# Patient Record
Sex: MALE | Race: WHITE | Employment: FULL TIME | URBAN - NONMETROPOLITAN AREA
[De-identification: names, ages, dates, MRNs, and addresses within clinical notes are randomized per-mention and may not be internally consistent; named-entity substitution may affect disease eponyms.]

---

## 2022-04-20 ENCOUNTER — APPOINTMENT (OUTPATIENT)
Dept: GENERAL RADIOLOGY | Age: 42
End: 2022-04-20
Payer: COMMERCIAL

## 2022-04-20 ENCOUNTER — HOSPITAL ENCOUNTER (OUTPATIENT)
Age: 42
Setting detail: OBSERVATION
Discharge: HOME | End: 2022-04-21
Attending: EMERGENCY MEDICINE | Admitting: INTERNAL MEDICINE
Payer: COMMERCIAL

## 2022-04-20 DIAGNOSIS — T78.40XA ALLERGIC REACTION, INITIAL ENCOUNTER: ICD-10-CM

## 2022-04-20 DIAGNOSIS — I21.4 NSTEMI (NON-ST ELEVATED MYOCARDIAL INFARCTION) (HCC): Primary | ICD-10-CM

## 2022-04-20 PROBLEM — R07.9 CHEST PAIN: Status: ACTIVE | Noted: 2022-04-20

## 2022-04-20 PROBLEM — I10 ESSENTIAL HYPERTENSION: Status: ACTIVE | Noted: 2020-06-23

## 2022-04-20 LAB
ALBUMIN SERPL-MCNC: 3.9 GM/DL (ref 3.4–5)
ALP BLD-CCNC: 70 U/L (ref 46–116)
ALT SERPL-CCNC: 32 U/L (ref 14–63)
ANION GAP: 9 MEQ/L (ref 8–16)
AST SERPL-CCNC: 20 U/L (ref 15–37)
BASOPHILS # BLD: 0.1 %
BASOPHILS ABSOLUTE: 0 THOU/MM3 (ref 0–0.1)
BILIRUB SERPL-MCNC: 0.6 MG/DL (ref 0.2–1)
BUN BLDV-MCNC: 15 MG/DL (ref 7–18)
CHLORIDE BLD-SCNC: 103 MEQ/L (ref 98–107)
CO2: 25 MEQ/L (ref 21–32)
CREAT SERPL-MCNC: 1.3 MG/DL (ref 0.6–1.3)
EKG ATRIAL RATE: 79 BPM
EKG ATRIAL RATE: 82 BPM
EKG P AXIS: 43 DEGREES
EKG P AXIS: 50 DEGREES
EKG P-R INTERVAL: 156 MS
EKG P-R INTERVAL: 160 MS
EKG Q-T INTERVAL: 368 MS
EKG Q-T INTERVAL: 370 MS
EKG QRS DURATION: 114 MS
EKG QRS DURATION: 116 MS
EKG QTC CALCULATION (BAZETT): 424 MS
EKG QTC CALCULATION (BAZETT): 429 MS
EKG R AXIS: -36 DEGREES
EKG R AXIS: -40 DEGREES
EKG T AXIS: 22 DEGREES
EKG T AXIS: 23 DEGREES
EKG VENTRICULAR RATE: 79 BPM
EKG VENTRICULAR RATE: 82 BPM
EOSINOPHIL # BLD: 0.2 %
EOSINOPHILS ABSOLUTE: 0 THOU/MM3 (ref 0–0.4)
GFR, ESTIMATED: 65 ML/MIN/1.73M2
GLUCOSE BLD-MCNC: 125 MG/DL (ref 74–106)
HCT VFR BLD CALC: 45.5 % (ref 42–52)
HEMOGLOBIN: 15.6 GM/DL (ref 14–18)
IMMATURE GRANS (ABS): 0.07 THOU/MM3 (ref 0–0.07)
IMMATURE GRANULOCYTES: 0.4 %
LYMPHOCYTES # BLD: 6.5 %
LYMPHOCYTES ABSOLUTE: 1 THOU/MM3 (ref 1–4.8)
MCH RBC QN AUTO: 29.6 PG (ref 27–31)
MCHC RBC AUTO-ENTMCNC: 34.2 GM/DL (ref 33–37)
MCV RBC AUTO: 86.6 FL (ref 80–94)
MONOCYTES # BLD: 5.8 %
MONOCYTES ABSOLUTE: 0.9 THOU/MM3 (ref 0.4–1.3)
NUCLEATED RED BLOOD CELLS: 0 /100 WBC
PDW BLD-RTO: 13.2 % (ref 11.5–14.5)
PLATELET # BLD: 173 THOU/MM3 (ref 130–400)
PMV BLD AUTO: 8.1 FL (ref 7.4–10.4)
POC CALCIUM: 8.8 MG/DL (ref 8.5–10.1)
POTASSIUM SERPL-SCNC: 3.7 MEQ/L (ref 3.5–5.1)
RBC # BLD: 5.26 MILL/MM3 (ref 4.7–6.1)
SEG NEUTROPHILS: 87 %
SEGMENTED NEUTROPHILS ABSOLUTE COUNT: 13.6 THOU/MM3 (ref 1.8–7.7)
SODIUM BLD-SCNC: 137 MEQ/L (ref 136–145)
TOTAL PROTEIN: 6.8 GM/DL (ref 6.4–8.2)
TROPONIN T: < 0.01 NG/ML
TROPONIN, HIGH SENSITIVITY: 80.7 PG/ML (ref 0–76.1)
WBC # BLD: 16 THOU/MM3 (ref 4.8–10.8)

## 2022-04-20 PROCEDURE — G0378 HOSPITAL OBSERVATION PER HR: HCPCS

## 2022-04-20 PROCEDURE — 96361 HYDRATE IV INFUSION ADD-ON: CPT

## 2022-04-20 PROCEDURE — 84484 ASSAY OF TROPONIN QUANT: CPT

## 2022-04-20 PROCEDURE — 6360000002 HC RX W HCPCS: Performed by: EMERGENCY MEDICINE

## 2022-04-20 PROCEDURE — 71045 X-RAY EXAM CHEST 1 VIEW: CPT

## 2022-04-20 PROCEDURE — 96374 THER/PROPH/DIAG INJ IV PUSH: CPT

## 2022-04-20 PROCEDURE — 99285 EMERGENCY DEPT VISIT HI MDM: CPT

## 2022-04-20 PROCEDURE — 6370000000 HC RX 637 (ALT 250 FOR IP): Performed by: EMERGENCY MEDICINE

## 2022-04-20 PROCEDURE — 36415 COLL VENOUS BLD VENIPUNCTURE: CPT

## 2022-04-20 PROCEDURE — 2580000003 HC RX 258: Performed by: EMERGENCY MEDICINE

## 2022-04-20 PROCEDURE — 85025 COMPLETE CBC W/AUTO DIFF WBC: CPT

## 2022-04-20 PROCEDURE — 93005 ELECTROCARDIOGRAM TRACING: CPT | Performed by: EMERGENCY MEDICINE

## 2022-04-20 PROCEDURE — 6370000000 HC RX 637 (ALT 250 FOR IP): Performed by: PHYSICIAN ASSISTANT

## 2022-04-20 PROCEDURE — 80053 COMPREHEN METABOLIC PANEL: CPT

## 2022-04-20 PROCEDURE — 99220 PR INITIAL OBSERVATION CARE/DAY 70 MINUTES: CPT | Performed by: PHYSICIAN ASSISTANT

## 2022-04-20 PROCEDURE — 2580000003 HC RX 258: Performed by: PHYSICIAN ASSISTANT

## 2022-04-20 PROCEDURE — 96372 THER/PROPH/DIAG INJ SC/IM: CPT

## 2022-04-20 RX ORDER — ENOXAPARIN SODIUM 100 MG/ML
100 INJECTION SUBCUTANEOUS 2 TIMES DAILY
Status: DISCONTINUED | OUTPATIENT
Start: 2022-04-21 | End: 2022-04-21 | Stop reason: HOSPADM

## 2022-04-20 RX ORDER — ENOXAPARIN SODIUM 100 MG/ML
1 INJECTION SUBCUTANEOUS ONCE
Status: COMPLETED | OUTPATIENT
Start: 2022-04-20 | End: 2022-04-20

## 2022-04-20 RX ORDER — ONDANSETRON 2 MG/ML
4 INJECTION INTRAMUSCULAR; INTRAVENOUS EVERY 6 HOURS PRN
Status: DISCONTINUED | OUTPATIENT
Start: 2022-04-20 | End: 2022-04-21 | Stop reason: HOSPADM

## 2022-04-20 RX ORDER — POTASSIUM CHLORIDE 20 MEQ/1
40 TABLET, EXTENDED RELEASE ORAL PRN
Status: DISCONTINUED | OUTPATIENT
Start: 2022-04-20 | End: 2022-04-21 | Stop reason: HOSPADM

## 2022-04-20 RX ORDER — OLMESARTAN MEDOXOMIL 20 MG/1
20 TABLET ORAL DAILY
COMMUNITY

## 2022-04-20 RX ORDER — METHYLPREDNISOLONE SODIUM SUCCINATE 125 MG/2ML
125 INJECTION, POWDER, LYOPHILIZED, FOR SOLUTION INTRAMUSCULAR; INTRAVENOUS ONCE
Status: COMPLETED | OUTPATIENT
Start: 2022-04-20 | End: 2022-04-20

## 2022-04-20 RX ORDER — ACETAMINOPHEN 650 MG/1
650 SUPPOSITORY RECTAL EVERY 6 HOURS PRN
Status: DISCONTINUED | OUTPATIENT
Start: 2022-04-20 | End: 2022-04-21 | Stop reason: HOSPADM

## 2022-04-20 RX ORDER — ATORVASTATIN CALCIUM 40 MG/1
40 TABLET, FILM COATED ORAL NIGHTLY
Status: DISCONTINUED | OUTPATIENT
Start: 2022-04-20 | End: 2022-04-21 | Stop reason: HOSPADM

## 2022-04-20 RX ORDER — ONDANSETRON 4 MG/1
4 TABLET, ORALLY DISINTEGRATING ORAL EVERY 8 HOURS PRN
Status: DISCONTINUED | OUTPATIENT
Start: 2022-04-20 | End: 2022-04-21 | Stop reason: HOSPADM

## 2022-04-20 RX ORDER — IBUPROFEN 200 MG
200 TABLET ORAL EVERY 6 HOURS PRN
Status: ON HOLD | COMMUNITY
End: 2022-04-21 | Stop reason: HOSPADM

## 2022-04-20 RX ORDER — POLYETHYLENE GLYCOL 3350 17 G/17G
17 POWDER, FOR SOLUTION ORAL DAILY PRN
Status: DISCONTINUED | OUTPATIENT
Start: 2022-04-20 | End: 2022-04-21 | Stop reason: HOSPADM

## 2022-04-20 RX ORDER — MAGNESIUM SULFATE IN WATER 40 MG/ML
2000 INJECTION, SOLUTION INTRAVENOUS PRN
Status: DISCONTINUED | OUTPATIENT
Start: 2022-04-20 | End: 2022-04-21 | Stop reason: HOSPADM

## 2022-04-20 RX ORDER — POTASSIUM CHLORIDE 7.45 MG/ML
10 INJECTION INTRAVENOUS PRN
Status: DISCONTINUED | OUTPATIENT
Start: 2022-04-20 | End: 2022-04-21 | Stop reason: HOSPADM

## 2022-04-20 RX ORDER — LOSARTAN POTASSIUM 50 MG/1
50 TABLET ORAL DAILY
Status: DISCONTINUED | OUTPATIENT
Start: 2022-04-21 | End: 2022-04-21 | Stop reason: HOSPADM

## 2022-04-20 RX ORDER — CLOPIDOGREL 300 MG/1
300 TABLET, FILM COATED ORAL ONCE
Status: COMPLETED | OUTPATIENT
Start: 2022-04-20 | End: 2022-04-20

## 2022-04-20 RX ORDER — SODIUM CHLORIDE 0.9 % (FLUSH) 0.9 %
5-40 SYRINGE (ML) INJECTION EVERY 12 HOURS SCHEDULED
Status: DISCONTINUED | OUTPATIENT
Start: 2022-04-20 | End: 2022-04-21 | Stop reason: HOSPADM

## 2022-04-20 RX ORDER — SODIUM CHLORIDE 9 MG/ML
INJECTION, SOLUTION INTRAVENOUS PRN
Status: DISCONTINUED | OUTPATIENT
Start: 2022-04-20 | End: 2022-04-21 | Stop reason: HOSPADM

## 2022-04-20 RX ORDER — AMLODIPINE BESYLATE 5 MG/1
5 TABLET ORAL DAILY
Status: DISCONTINUED | OUTPATIENT
Start: 2022-04-21 | End: 2022-04-21 | Stop reason: HOSPADM

## 2022-04-20 RX ORDER — 0.9 % SODIUM CHLORIDE 0.9 %
1000 INTRAVENOUS SOLUTION INTRAVENOUS ONCE
Status: COMPLETED | OUTPATIENT
Start: 2022-04-20 | End: 2022-04-20

## 2022-04-20 RX ORDER — ACETAMINOPHEN 325 MG/1
650 TABLET ORAL EVERY 6 HOURS PRN
Status: DISCONTINUED | OUTPATIENT
Start: 2022-04-20 | End: 2022-04-21 | Stop reason: HOSPADM

## 2022-04-20 RX ORDER — SODIUM CHLORIDE 0.9 % (FLUSH) 0.9 %
5-40 SYRINGE (ML) INJECTION PRN
Status: DISCONTINUED | OUTPATIENT
Start: 2022-04-20 | End: 2022-04-21 | Stop reason: HOSPADM

## 2022-04-20 RX ADMIN — CLOPIDOGREL BISULFATE 300 MG: 300 TABLET, FILM COATED ORAL at 16:37

## 2022-04-20 RX ADMIN — ENOXAPARIN SODIUM 100 MG: 100 INJECTION SUBCUTANEOUS at 16:37

## 2022-04-20 RX ADMIN — SODIUM CHLORIDE, PRESERVATIVE FREE 10 ML: 5 INJECTION INTRAVENOUS at 22:11

## 2022-04-20 RX ADMIN — SODIUM CHLORIDE 1000 ML: 9 INJECTION, SOLUTION INTRAVENOUS at 14:00

## 2022-04-20 RX ADMIN — ATORVASTATIN CALCIUM 40 MG: 80 TABLET, FILM COATED ORAL at 22:08

## 2022-04-20 RX ADMIN — METHYLPREDNISOLONE SODIUM SUCCINATE 125 MG: 125 INJECTION, POWDER, FOR SOLUTION INTRAMUSCULAR; INTRAVENOUS at 13:50

## 2022-04-20 RX ADMIN — EPINEPHRINE 0.4 MG: 1 INJECTION INTRAMUSCULAR; INTRAVENOUS; SUBCUTANEOUS at 13:52

## 2022-04-20 ASSESSMENT — ENCOUNTER SYMPTOMS
WHEEZING: 0
EYE DISCHARGE: 0
ALLERGIC/IMMUNOLOGIC NEGATIVE: 1
SHORTNESS OF BREATH: 1
TROUBLE SWALLOWING: 0
VOMITING: 0
GASTROINTESTINAL NEGATIVE: 1
EYES NEGATIVE: 1
DIARRHEA: 0
EYE PAIN: 0
SORE THROAT: 0
ABDOMINAL PAIN: 0
BLOOD IN STOOL: 0

## 2022-04-20 ASSESSMENT — LIFESTYLE VARIABLES
HOW OFTEN DO YOU HAVE A DRINK CONTAINING ALCOHOL: NEVER
HOW MANY STANDARD DRINKS CONTAINING ALCOHOL DO YOU HAVE ON A TYPICAL DAY: 1 OR 2

## 2022-04-20 ASSESSMENT — PAIN SCALES - GENERAL
PAINLEVEL_OUTOF10: 0

## 2022-04-20 NOTE — H&P
Hospitalist - History & Physical      Patient: Dung Ellison    Unit/Bed:8B-29/029-A  YOB: 1980  MRN: [id number]   Acct: [de-identified]   PCP: EVELIO Hilario - CNP      Assessment and Plan:        1. Chest pain:   a. ACS rule out  b. (+) high sensitivity troponin at [facility] - trend troponin here  c. Statin to begin tonight  d. Plavix loaded in ED  e. Will avoid ASA at this time due to presumed anaphylaxis to NSAIDS earlier today  2. Anaphylaxis:   a. Patient took Motrin prior to arrival  b. Was treated with epi and steroids  3. Essential hypertension:   a. Continue home medications      CC:  Chest pain    HPI: Patient transferred from [facility] for further evaluation of chest pain. The patient had presented with an allergic reaction after taking Motrin for a headache. The patient received Epinephrine and steroids. He developed chest pain shortly after the epi was administered. The patient had a positive high sensitivity troponin at [facility]. Patient transferred for further evaluation of chest pain and troponin elevation. ROS: Review of Systems   Constitutional: Negative. HENT: Negative. Eyes: Negative. Respiratory: Positive for shortness of breath. Cardiovascular: Positive for chest pain. Gastrointestinal: Negative. Endocrine: Negative. Genitourinary: Negative. Musculoskeletal: Negative. Skin: Negative. Allergic/Immunologic: Negative. Neurological: Negative. Hematological: Negative. Psychiatric/Behavioral: Negative.       PMH:    Past Medical History:   Diagnosis Date    Hyperlipidemia     Hypertension      SHX:    Social History     Socioeconomic History    Marital status:      Spouse name: Not on file    Number of children: Not on file    Years of education: Not on file    Highest education level: Not on file   Occupational History    Not on file   Tobacco Use    Smoking status: Never Smoker    Smokeless tobacco: Never Used   Substance and Sexual Activity    Alcohol use: Yes     Comment: SOCIALLY    Drug use: Not on file    Sexual activity: Not on file   Other Topics Concern    Not on file   Social History Narrative    Not on file     Social Determinants of Health     Financial Resource Strain:     Difficulty of Paying Living Expenses: Not on file   Food Insecurity:     Worried About Running Out of Food in the Last Year: Not on file    Santos of Food in the Last Year: Not on file   Transportation Needs:     Lack of Transportation (Medical): Not on file    Lack of Transportation (Non-Medical): Not on file   Physical Activity:     Days of Exercise per Week: Not on file    Minutes of Exercise per Session: Not on file   Stress:     Feeling of Stress : Not on file   Social Connections:     Frequency of Communication with Friends and Family: Not on file    Frequency of Social Gatherings with Friends and Family: Not on file    Attends Anabaptist Services: Not on file    Active Member of [address] [place] [address] or Organizations: Not on file    Attends Club or Organization Meetings: Not on file    Marital Status: Not on file   Intimate Partner Violence:     Fear of Current or Ex-Partner: Not on file    Emotionally Abused: Not on file    Physically Abused: Not on file    Sexually Abused: Not on file   Housing Stability:     Unable to Pay for Housing in the Last Year: Not on file    Number of [place] in the Last Year: Not on file    Unstable Housing in the Last Year: Not on file     FHX: History reviewed. No pertinent family history. Allergies:    Allergies   Allergen Reactions    Codeine     Ibuprofen      Medications:     sodium chloride        amLODIPine  5 mg Oral Daily    losartan  50 mg Oral Daily    sodium chloride flush  5-40 mL IntraVENous 2 times per day    [START ON 4/21/2022] enoxaparin  100 mg SubCUTAneous BID     sodium chloride flush, 5-40 mL, PRN  sodium chloride, , PRN  ondansetron, 4 mg, Q8H PRN   Or  ondansetron, 4 mg, Q6H PRN  polyethylene glycol, 17 g, Daily PRN  acetaminophen, 650 mg, Q6H PRN   Or  acetaminophen, 650 mg, Q6H PRN  potassium chloride, 40 mEq, PRN   Or  potassium alternative oral replacement, 40 mEq, PRN   Or  potassium chloride, 10 mEq, PRN  magnesium sulfate, 2,000 mg, PRN        Labs:   Recent Results (from the past 24 hour(s))   EKG Dizziness    Collection Time: 04/20/22  3:32 PM   Result Value Ref Range    Ventricular Rate 82 BPM    Atrial Rate 82 BPM    P-R Interval 160 ms    QRS Duration 116 ms    Q-T Interval 368 ms    QTc Calculation (Bazett) 429 ms    P Axis 43 degrees    R Axis -40 degrees    T Axis 22 degrees   CBC with Auto Differential    Collection Time: 04/20/22  3:50 PM   Result Value Ref Range    WBC 16.0 (H) 4.8 - 10.8 thou/mm3    RBC 5.26 4.70 - 6.10 mill/mm3    Hemoglobin 15.6 14.0 - 18.0 gm/dl    Hematocrit 45.5 42.0 - 52.0 %    MCV 86.6 80.0 - 94.0 fL    MCH 29.6 27.0 - 31.0 pg    MCHC 34.2 33.0 - 37.0 gm/dl    RDW 13.2 11.5 - 14.5 %    Platelets 673 741 - 714 thou/mm3    MPV 8.1 7.4 - 10.4 fL   Troponin    Collection Time: 04/20/22  3:50 PM   Result Value Ref Range    Troponin, High Sensitivity 80.7 (H) 0.0 - 76.1 pg/mL   Comprehensive Metabolic Panel    Collection Time: 04/20/22  3:50 PM   Result Value Ref Range    Glucose 125 (H) 74 - 106 mg/dl    CREATININE 1.3 0.6 - 1.3 mg/dl    BUN 15 7 - 18 mg/dl    Sodium 137 136 - 145 meq/l    Potassium 3.7 3.5 - 5.1 meq/l    Chloride 103 98 - 107 meq/l    CO2 25 21 - 32 meq/l    POC CALCIUM 8.8 8.5 - 10.1 mg/dl    AST 20 15 - 37 U/L    Alkaline Phosphatase 70 46 - 116 U/L    Total Protein 6.8 6.4 - 8.2 gm/dl    Albumin 3.9 3.4 - 5.0 gm/dl    Total Bilirubin 0.6 0.2 - 1.0 mg/dl    ALT 32 14 - 63 U/L   Glomerular Filtration Rate, Estimated    Collection Time: 04/20/22  3:50 PM   Result Value Ref Range    GFR, Estimated 65 (A) ml/min/1.73m2   ANION GAP    Collection Time: 04/20/22  3:50 PM   Result Value Ref Range Anion Gap 9.0 8.0 - 16.0 meq/l   EKG 12 Lead    Collection Time: 04/20/22  4:27 PM   Result Value Ref Range    Ventricular Rate 79 BPM    Atrial Rate 79 BPM    P-R Interval 156 ms    QRS Duration 114 ms    Q-T Interval 370 ms    QTc Calculation (Bazett) 424 ms    P Axis 50 degrees    R Axis -36 degrees    T Axis 23 degrees         Vital Signs: T: 98.5F P: 88 RR: 18 B/P: 121/64: FiO2: RA: O2 Sat:94%: I/O: No intake or output data in the 24 hours ending 04/20/22 1920      General:   Well appearing, no acute distress  HEENT:  normocephalic and atraumatic. No scleral icterus. PEARLA, mucous membranes moist  Neck: supple. Trachea midline. No JVD. Full ROM, no meningismus. Lungs: clear to auscultation. No retractions, no accessory muscle use. Cardiac: RRR, no murmur, 2+ pulses  Abdomen: soft. Nontender. Bowel sounds active  Extremities:  No clubbing, cyanosis x 4, no edema    Vasculature: capillary refill < 3 seconds. Skin:  warm and dry. no visible rashes  Psych:  Alert and oriented x3. Affect appropriate  Lymph:  No supraclavicular adenopathy. Neurologic:  CN II-XII grossly intact. No focal deficit. Data: (All radiographs, tracings, PFTs, and imaging are personally viewed and interpreted unless otherwise noted).     Outside data reviewed   EKG: rhythm: normal sinus rhythm, rate=79 bpm, cb=952 ms, uaf=787 ms, nm=676 ms, axis=50 degrees        Electronically signed by  Peyman Varner PA-C

## 2022-04-20 NOTE — ED PROVIDER NOTES
[address]  [address] [place] [address]  Phone: [phone]    eMERGENCY dEPARTMENT eNCOUnter           CHIEF COMPLAINT       Chief Complaint   Patient presents with    Allergic Reaction       Nurses Notes reviewed and I agree except as noted in the HPI. HISTORY OF PRESENT ILLNESS    Viry Castaneda is a 37 y.o. male who presented via EMS with above-mentioned complaint. He developed generalized rash and redness at 9:30 AM today. He took ibuprofen for headache 2 hours prior. He was complaining of itching slight lightheadedness and dizziness. He has slight difficulty breathing. He denies chest pain. He denies tongue or lip swelling. He was given Benadryl 50 mg IV by EMS. He said that he started feeling better. He denies chest pain or syncope. Patient said that he has similar reaction when he took Advil 2 years ago. REVIEW OF SYSTEMS     Review of Systems   Constitutional: Positive for fatigue. Negative for chills and fever. HENT: Negative for drooling, sore throat and trouble swallowing. Eyes: Negative for pain and discharge. Respiratory: Positive for shortness of breath. Negative for wheezing. Cardiovascular: Negative for chest pain and palpitations. Gastrointestinal: Negative for abdominal pain, blood in stool, diarrhea and vomiting. Genitourinary: Negative for dysuria and hematuria. Musculoskeletal: Negative for neck pain and neck stiffness. Skin: Positive for rash. Neurological: Positive for dizziness and light-headedness. Negative for seizures, syncope and headaches. Hematological: Negative for adenopathy. Psychiatric/Behavioral: Negative for agitation and hallucinations. PAST MEDICAL HISTORY    has a past medical history of Hyperlipidemia and Hypertension. SURGICAL HISTORY      has a past surgical history that includes Vasectomy.     CURRENT MEDICATIONS       Previous Medications    AMLODIPINE BESYLATE PO    Take by mouth IBUPROFEN (ADVIL;MOTRIN) 200 MG TABLET    Take 200 mg by mouth every 6 hours as needed for Pain    LOSARTAN POTASSIUM PO    Take by mouth    PREDNISONE (DELTASONE) 20 MG TABLET    3 daily for 2 days, 2 daily for 2 days, 1 daily for 2 days for inflammation in the legs and ankles. ALLERGIES     is allergic to codeine. FAMILY HISTORY     has no family status information on file. family history is not on file. SOCIAL HISTORY      reports that he has never smoked. He does not have any smokeless tobacco history on file. He reports that he does not drink alcohol. PHYSICAL EXAM     INITIAL VITALS:  height is 6' 3\" (1.905 m) and weight is 210 lb (95.3 kg). His temporal temperature is 96.9 °F (36.1 °C). His blood pressure is 114/67 and his pulse is 95. His respiration is 20 and oxygen saturation is 95%. Physical Exam  Vitals and nursing note reviewed. Constitutional:       General: He is in acute distress. Appearance: He is well-developed. HENT:      Head: Atraumatic. Nose:      Comments: His face is flushed     Mouth/Throat:      Mouth: Mucous membranes are moist.      Pharynx: Oropharynx is clear. Comments: No angioedema  Eyes:      Conjunctiva/sclera: Conjunctivae normal.      Pupils: Pupils are equal, round, and reactive to light. Neck:      Thyroid: No thyromegaly. Vascular: No JVD. Trachea: No tracheal deviation. Cardiovascular:      Rate and Rhythm: Normal rate and regular rhythm. Heart sounds: No murmur heard. No friction rub. No gallop. Pulmonary:      Effort: Respiratory distress present. Breath sounds: Normal breath sounds. No stridor. No wheezing. Comments: His work of breathing is slightly increased  Abdominal:      General: Bowel sounds are normal.      Palpations: Abdomen is soft. Tenderness: There is no abdominal tenderness. Musculoskeletal:         General: No tenderness. Cervical back: Neck supple.    Skin:     Comments: He has red rash and patches of hives on his face, neck and upper extremities and upper chest.   Neurological:      Mental Status: He is alert. DIFFERENTIAL DIAGNOSIS:       DIAGNOSTIC RESULTS     EKG:  Sinus rhythm, normal P waves, normal NM interval.  Low QRS voltage, left axis deviation.,  Incomplete right bundle branch block    RADIOLOGY:     LABS:   Labs Reviewed   CBC WITH AUTO DIFFERENTIAL - Abnormal; Notable for the following components:       Result Value    WBC 16.0 (*)     All other components within normal limits   TROPONIN - Abnormal; Notable for the following components:    Troponin, High Sensitivity 80.7 (*)     All other components within normal limits   COMPREHENSIVE METABOLIC PANEL - Abnormal; Notable for the following components:    Glucose 125 (*)     All other components within normal limits   GLOMERULAR FILTRATION RATE, ESTIMATED - Abnormal; Notable for the following components:    GFR, Estimated 65 (*)     All other components within normal limits   ANION GAP   DIFFERENTIAL       EMERGENCY DEPARTMENT COURSE:   Vitals:    Vitals:    04/20/22 1415 04/20/22 1430 04/20/22 1445 04/20/22 1500   BP: (!) 118/106 118/80 102/88 114/67   Pulse:       Resp:       Temp:       TempSrc:       SpO2: 98% 100% 96% 95%   Weight:       Height:         Received normal saline IV, Solu-Medrol 125 mg IV, epinephrine 0.4 mg IM  Reevaluation at 3:30 PM:  He was awake and alert, he reported improvement, his rash resolved. He stated that he had 2 episodes of brief sharp lower sternal chest pain. Patient has no history of heart or lung disease. Reevaluation at 4:30 PM:  He was awake and alert, he denies chest pain. He received Plavix p.o. and Lovenox subcu. Repeat EKG showed no change from the first EKG. I discussed with him diagnosis and treatment plan. He was agreeable for admission. Consult:  I spoke with Liilana Perez RN who accept the patient to the hospitalist service.     FINAL IMPRESSION      1. NSTEMI (non-ST elevated myocardial infarction) ([facility] [address]    2.  Allergic reaction, initial encounter          DISPOSITION/PLAN   Admit, condition is stable    (Please note that portions of this note were completed with a voice recognition program.  Efforts were made to edit the dictations but occasionally words are mis-transcribed.)    MD Jeovany Ly MD  04/20/22 5843

## 2022-04-20 NOTE — ED NOTES
Pt. Presents to ED via [facility]. EMS with c/o redness in bilateral hands since approx. 9-10 am today, pt. Voices took an advil prior to noticing redness, pt. Voices was sitting at [employer] and continued to have increased redness to arms, face, back and neck and itching. Pt. Denies any tongue swelling or difficulty breathing.       Nicolasa Courser, RN  04/20/22 8197

## 2022-04-20 NOTE — ED NOTES
Dr. Jody Ferrara into discuss test results and poc-admit to [facility]- with pt.and spouse. Pt. Agreeable to admit and transport to [facility] via EMS.      Enzo Phelps RN  04/20/22 1147

## 2022-04-20 NOTE — ED NOTES
Pt. Resting quietly, voices feeling better, spouse at bedside. Dr. Nancy Villarreal updated on pt.       Greer Cedeno RN  04/20/22 1877

## 2022-04-21 ENCOUNTER — APPOINTMENT (OUTPATIENT)
Dept: NON INVASIVE DIAGNOSTICS | Age: 42
End: 2022-04-21
Payer: COMMERCIAL

## 2022-04-21 VITALS
BODY MASS INDEX: 26.11 KG/M2 | RESPIRATION RATE: 16 BRPM | OXYGEN SATURATION: 95 % | HEART RATE: 89 BPM | TEMPERATURE: 98.3 F | DIASTOLIC BLOOD PRESSURE: 93 MMHG | WEIGHT: 210 LBS | HEIGHT: 75 IN | SYSTOLIC BLOOD PRESSURE: 138 MMHG

## 2022-04-21 LAB
ANION GAP SERPL CALCULATED.3IONS-SCNC: 11 MEQ/L (ref 8–16)
BASOPHILS # BLD: 0.1 %
BASOPHILS ABSOLUTE: 0 THOU/MM3 (ref 0–0.1)
BUN BLDV-MCNC: 16 MG/DL (ref 7–22)
CALCIUM SERPL-MCNC: 9.4 MG/DL (ref 8.5–10.5)
CHLORIDE BLD-SCNC: 101 MEQ/L (ref 98–111)
CHOLESTEROL, TOTAL: 168 MG/DL (ref 100–199)
CO2: 24 MEQ/L (ref 23–33)
CREAT SERPL-MCNC: 1 MG/DL (ref 0.4–1.2)
EKG ATRIAL RATE: 79 BPM
EKG P AXIS: 55 DEGREES
EKG P-R INTERVAL: 156 MS
EKG Q-T INTERVAL: 360 MS
EKG QRS DURATION: 114 MS
EKG QTC CALCULATION (BAZETT): 412 MS
EKG R AXIS: -45 DEGREES
EKG T AXIS: 36 DEGREES
EKG VENTRICULAR RATE: 79 BPM
EOSINOPHIL # BLD: 0 %
EOSINOPHILS ABSOLUTE: 0 THOU/MM3 (ref 0–0.4)
ERYTHROCYTE [DISTWIDTH] IN BLOOD BY AUTOMATED COUNT: 12.5 % (ref 11.5–14.5)
ERYTHROCYTE [DISTWIDTH] IN BLOOD BY AUTOMATED COUNT: 38.9 FL (ref 35–45)
GFR SERPL CREATININE-BSD FRML MDRD: 83 ML/MIN/1.73M2
GLUCOSE BLD-MCNC: 169 MG/DL (ref 70–108)
HCT VFR BLD CALC: 44 % (ref 42–52)
HDLC SERPL-MCNC: 40 MG/DL
HEMOGLOBIN: 15.3 GM/DL (ref 14–18)
IMMATURE GRANS (ABS): 0.03 THOU/MM3 (ref 0–0.07)
IMMATURE GRANULOCYTES: 0.3 %
LDL CHOLESTEROL CALCULATED: 112 MG/DL
LV EF: 65 %
LVEF MODALITY: NORMAL
LYMPHOCYTES # BLD: 7 %
LYMPHOCYTES ABSOLUTE: 0.8 THOU/MM3 (ref 1–4.8)
MCH RBC QN AUTO: 29.9 PG (ref 26–33)
MCHC RBC AUTO-ENTMCNC: 34.8 GM/DL (ref 32.2–35.5)
MCV RBC AUTO: 86.1 FL (ref 80–94)
MONOCYTES # BLD: 2.2 %
MONOCYTES ABSOLUTE: 0.2 THOU/MM3 (ref 0.4–1.3)
NUCLEATED RED BLOOD CELLS: 0 /100 WBC
PLATELET # BLD: 186 THOU/MM3 (ref 130–400)
PMV BLD AUTO: 10.2 FL (ref 9.4–12.4)
POTASSIUM REFLEX MAGNESIUM: 4.4 MEQ/L (ref 3.5–5.2)
RBC # BLD: 5.11 MILL/MM3 (ref 4.7–6.1)
SEG NEUTROPHILS: 90.4 %
SEGMENTED NEUTROPHILS ABSOLUTE COUNT: 10.2 THOU/MM3 (ref 1.8–7.7)
SODIUM BLD-SCNC: 136 MEQ/L (ref 135–145)
TRIGL SERPL-MCNC: 79 MG/DL (ref 0–199)
TROPONIN T: < 0.01 NG/ML
WBC # BLD: 11.3 THOU/MM3 (ref 4.8–10.8)

## 2022-04-21 PROCEDURE — 6370000000 HC RX 637 (ALT 250 FOR IP): Performed by: PHYSICIAN ASSISTANT

## 2022-04-21 PROCEDURE — 80061 LIPID PANEL: CPT

## 2022-04-21 PROCEDURE — 93017 CV STRESS TEST TRACING ONLY: CPT | Performed by: INTERNAL MEDICINE

## 2022-04-21 PROCEDURE — 2580000003 HC RX 258: Performed by: PHYSICIAN ASSISTANT

## 2022-04-21 PROCEDURE — G0378 HOSPITAL OBSERVATION PER HR: HCPCS

## 2022-04-21 PROCEDURE — 99225 PR SBSQ OBSERVATION CARE/DAY 25 MINUTES: CPT | Performed by: NURSE PRACTITIONER

## 2022-04-21 PROCEDURE — 85025 COMPLETE CBC W/AUTO DIFF WBC: CPT

## 2022-04-21 PROCEDURE — 3430000000 HC RX DIAGNOSTIC RADIOPHARMACEUTICAL: Performed by: NURSE PRACTITIONER

## 2022-04-21 PROCEDURE — 6360000002 HC RX W HCPCS: Performed by: PHYSICIAN ASSISTANT

## 2022-04-21 PROCEDURE — 96372 THER/PROPH/DIAG INJ SC/IM: CPT

## 2022-04-21 PROCEDURE — A9500 TC99M SESTAMIBI: HCPCS | Performed by: NURSE PRACTITIONER

## 2022-04-21 PROCEDURE — 93005 ELECTROCARDIOGRAM TRACING: CPT | Performed by: PHYSICIAN ASSISTANT

## 2022-04-21 PROCEDURE — 84484 ASSAY OF TROPONIN QUANT: CPT

## 2022-04-21 PROCEDURE — 78452 HT MUSCLE IMAGE SPECT MULT: CPT

## 2022-04-21 PROCEDURE — 36415 COLL VENOUS BLD VENIPUNCTURE: CPT

## 2022-04-21 PROCEDURE — 80048 BASIC METABOLIC PNL TOTAL CA: CPT

## 2022-04-21 RX ORDER — SODIUM CHLORIDE 9 MG/ML
500 INJECTION, SOLUTION INTRAVENOUS CONTINUOUS PRN
Status: CANCELLED | OUTPATIENT
Start: 2022-04-21 | End: 2022-04-21

## 2022-04-21 RX ORDER — ATORVASTATIN CALCIUM 40 MG/1
40 TABLET, FILM COATED ORAL NIGHTLY
Qty: 30 TABLET | Refills: 3 | Status: SHIPPED | OUTPATIENT
Start: 2022-04-21

## 2022-04-21 RX ORDER — ALBUTEROL SULFATE 90 UG/1
2 AEROSOL, METERED RESPIRATORY (INHALATION) PRN
Status: CANCELLED | OUTPATIENT
Start: 2022-04-21 | End: 2022-04-21

## 2022-04-21 RX ORDER — ATROPINE SULFATE 0.1 MG/ML
0.5 INJECTION INTRAVENOUS EVERY 5 MIN PRN
Status: CANCELLED | OUTPATIENT
Start: 2022-04-21 | End: 2022-04-21

## 2022-04-21 RX ORDER — NITROGLYCERIN 0.4 MG/1
0.4 TABLET SUBLINGUAL EVERY 5 MIN PRN
Status: CANCELLED | OUTPATIENT
Start: 2022-04-21 | End: 2022-04-21

## 2022-04-21 RX ORDER — METOPROLOL TARTRATE 5 MG/5ML
5 INJECTION INTRAVENOUS EVERY 5 MIN PRN
Status: CANCELLED | OUTPATIENT
Start: 2022-04-21 | End: 2022-04-21

## 2022-04-21 RX ORDER — SODIUM CHLORIDE 0.9 % (FLUSH) 0.9 %
5-40 SYRINGE (ML) INJECTION PRN
Status: CANCELLED | OUTPATIENT
Start: 2022-04-21 | End: 2022-04-21

## 2022-04-21 RX ADMIN — ACETAMINOPHEN 650 MG: 325 TABLET ORAL at 08:16

## 2022-04-21 RX ADMIN — LOSARTAN POTASSIUM 50 MG: 50 TABLET, FILM COATED ORAL at 11:23

## 2022-04-21 RX ADMIN — AMLODIPINE BESYLATE 5 MG: 5 TABLET ORAL at 11:23

## 2022-04-21 RX ADMIN — ENOXAPARIN SODIUM 100 MG: 100 INJECTION SUBCUTANEOUS at 06:58

## 2022-04-21 RX ADMIN — SODIUM CHLORIDE, PRESERVATIVE FREE 10 ML: 5 INJECTION INTRAVENOUS at 08:17

## 2022-04-21 RX ADMIN — Medication 8.7 MILLICURIE: at 12:08

## 2022-04-21 RX ADMIN — Medication 32.1 MILLICURIE: at 13:15

## 2022-04-21 ASSESSMENT — PAIN DESCRIPTION - ORIENTATION: ORIENTATION: MID

## 2022-04-21 ASSESSMENT — PAIN SCALES - GENERAL
PAINLEVEL_OUTOF10: 0
PAINLEVEL_OUTOF10: 2
PAINLEVEL_OUTOF10: 3
PAINLEVEL_OUTOF10: 0
PAINLEVEL_OUTOF10: 2

## 2022-04-21 ASSESSMENT — PAIN DESCRIPTION - DESCRIPTORS: DESCRIPTORS: ACHING

## 2022-04-21 ASSESSMENT — PAIN DESCRIPTION - LOCATION: LOCATION: HEAD

## 2022-04-21 ASSESSMENT — PAIN - FUNCTIONAL ASSESSMENT: PAIN_FUNCTIONAL_ASSESSMENT: ACTIVITIES ARE NOT PREVENTED

## 2022-04-21 NOTE — PROGRESS NOTES
turning red and then it progressed and then he had a syncopal episode, patient tells me he does have a history of CAD in his family as his grandfather had a heart attack at 77 and his other grandfather had open heart surgery  UpDate: Stress test did not reveal any ischemia, patient feeling better, patient discharged in stable condition                  Subjective (past 24 hours): He states yesterday when he was at urgent care he had a moment of tightness in his mid chest however has not had anything since, denies any further episodes of lightheadedness or dizziness or shortness of breath      Medications:  Reviewed    Infusion Medications    sodium chloride       Scheduled Medications    amLODIPine  5 mg Oral Daily    losartan  50 mg Oral Daily    sodium chloride flush  5-40 mL IntraVENous 2 times per day    enoxaparin  100 mg SubCUTAneous BID    atorvastatin  40 mg Oral Nightly     PRN Meds: sodium chloride flush, sodium chloride, ondansetron **OR** ondansetron, polyethylene glycol, acetaminophen **OR** acetaminophen, potassium chloride **OR** potassium alternative oral replacement **OR** potassium chloride, magnesium sulfate      Intake/Output Summary (Last 24 hours) at 4/21/2022 0739  Last data filed at 4/21/2022 0325  Gross per 24 hour   Intake 400 ml   Output --   Net 400 ml       Diet:  ADULT DIET; Regular    Exam:  /61   Pulse 76   Temp 97.7 °F (36.5 °C) (Oral)   Resp 18   Ht 6' 3\" (1.905 m)   Wt 210 lb (95.3 kg)   SpO2 100%   BMI 26.25 kg/m²     General appearance: No apparent distress, appears stated age and cooperative. HEENT: Pupils equal, round, and reactive to light. Conjunctivae/corneas clear. Neck: Supple, with full range of motion. No jugular venous distention. Trachea midline. Respiratory:  Normal respiratory effort. Clear to auscultation, bilaterally without Rales/Wheezes/Rhonchi.   Cardiovascular: Regular rate and rhythm with normal S1/S2 without murmurs, rubs or gallops. Abdomen: Soft, non-tender, non-distended with normal bowel sounds. Musculoskeletal: passive and active ROM x 4 extremities. Skin: Skin color, texture, turgor normal.    Neurologic:  Neurovascularly intact without any focal sensory/motor deficits. Cranial nerves: II-XII intact, grossly non-focal.  Psychiatric: Alert and oriented, thought content appropriate  Capillary Refill: Brisk,< 3 seconds   Peripheral Pulses: +2 palpable, equal bilaterally       Labs:   Recent Labs     04/20/22  1550 04/21/22  0206   WBC 16.0* 11.3*   HGB 15.6 15.3   HCT 45.5 44.0    186     Recent Labs     04/20/22  1550 04/21/22  0206    136   K 3.7 4.4    101   CO2 25 24   BUN 15 16   CREATININE 1.3 1.0   CALCIUM  --  9.4     Recent Labs     04/20/22  1550   AST 20   ALT 32   BILITOT 0.6   ALKPHOS 70     Microbiology:    None    Radiology:  XR CHEST PORTABLE    Result Date: 4/20/2022  1 view chest x-ray Comparison: None Findings: No consolidation or effusion. No pneumothorax. Normal size heart. No pulmonary vascular congestion. No acute fracture. No acute findings.  This document has been electronically signed by: Elayne oBtello MD on 04/20/2022 07:21 PM    Stress test:  Summary   Exercise EKG stress test is not suggestive for ischemia.   The nuclear images is not suggestive for myocardial ischemia.     DVT prophylaxis: [x] Lovenox                                 [] SCDs                                 [] SQ Heparin                                 [] Encourage ambulation           [] Already on Anticoagulation     Code Status: Full Code    PT/OT Eval Status: Monitor    Tele:   [x] yes sinus rhythm heart rate 77             [] no    Active Hospital Problems    Diagnosis Date Noted    Chest pain [R07.9] 04/20/2022     Priority: Medium    Allergic reaction [T78.40XA] 04/20/2022     Priority: Medium     Disposition: Home    Condition: Stable    Time spent: Less than 30 minutes    Electronically signed by Alexander Robles EVELIO Xie - CNP on 4/21/2022 at 7:39 AM

## 2022-04-21 NOTE — PROGRESS NOTES
Discharge teaching and instructions for diagnosis/procedure of chest pain, allergic reaction completed with patient using teachback method. AVS reviewed. Printed prescriptions given to patient. Patient voiced understanding regarding prescriptions, follow up appointments, and care of self at home. Discharged ambulatory to  home with support per family.

## 2022-04-21 NOTE — PLAN OF CARE
Problem: Discharge Planning  Goal: Discharge to home or other facility with appropriate resources  Outcome: Progressing  Flowsheets (Taken 4/21/2022 0055)  Discharge to home or other facility with appropriate resources: Identify barriers to discharge with patient and caregiver  Note: Pt will discharge home when appropriate. Problem: Cardiovascular - Adult  Goal: Maintains optimal cardiac output and hemodynamic stability  Outcome: Progressing  Note: Pt vitals are within normal range. Cap refills and pulses all within normal ranges on all extremities. Will continue to assess. Goal: Absence of cardiac dysrhythmias or at baseline  Outcome: Progressing  Note: Normal sinus rhythm      Problem: Metabolic/Fluid and Electrolytes - Adult  Goal: Electrolytes maintained within normal limits  Outcome: Progressing  Note: Pt electrolytes within normal limits on my shift. Will continue to monitor. Problem: Skin/Tissue Integrity - Adult  Goal: Skin integrity remains intact  Outcome: Progressing  Note: Absence of new skin integrity issues on my shift. Will continue to assess. Pt verbalizes understanding of care plan. Pt contributes to goal settings.

## 2022-04-21 NOTE — PLAN OF CARE
Problem: Discharge Planning  Goal: Discharge to home or other facility with appropriate resources  4/21/2022 1129 by Cindy Morgan RN  Outcome: Progressing  Flowsheets (Taken 4/21/2022 1129)  Discharge to home or other facility with appropriate resources: Arrange for needed discharge resources and transportation as appropriate  Note: Patient from home with spouse and family. Patient planning to return home. Denies needs at this time. Problem: Cardiovascular - Adult  Goal: Maintains optimal cardiac output and hemodynamic stability  4/21/2022 1129 by Cindy Morgan RN  Outcome: Progressing  Flowsheets (Taken 4/21/2022 1129)  Maintains optimal cardiac output and hemodynamic stability:   Monitor blood pressure and heart rate   Assess for signs of decreased cardiac output  Note: Blood pressure running lower this morning, elevated with recheck. BP meds administered as ordered. Telemetry monitoring continues as ordered, no arrhthymias noted. Patient denies chest pain when asked. Awaiting stress test this afternoon. Problem: Cardiovascular - Adult  Goal: Absence of cardiac dysrhythmias or at baseline  4/21/2022 1129 by Cindy Morgan RN  Outcome: Progressing  Flowsheets (Taken 4/21/2022 1129)  Absence of cardiac dysrhythmias or at baseline: Monitor cardiac rate and rhythm  Note: Telemetry monitoring continues as ordered, NSR noted. Rate well controlled. Problem: Metabolic/Fluid and Electrolytes - Adult  Goal: Electrolytes maintained within normal limits  4/21/2022 1129 by Cindy Morgan RN  Outcome: Progressing  Flowsheets (Taken 4/21/2022 1129)  Electrolytes maintained within normal limits: Monitor labs and assess patient for signs and symptoms of electrolyte imbalances  Note: Electrolytes stable on AM blood work. Problem: Skin/Tissue Integrity - Adult  Goal: Skin integrity remains intact  4/21/2022 1129 by Cindy Morgan RN  Outcome: Progressing  Note: Skin assessed every shift.  Encouraging repositioning for pressure ulcer prevention. Patient able to make request changes in position to help prevent breakdown. Care plan reviewed with patient. Patient verbalizes understanding of the plan of care and contribute to goal setting.

## 2022-04-21 NOTE — CARE COORDINATION
04/21/22 1114   Service Assessment   Patient Orientation Alert and Oriented   History Provided By Patient   Primary Caregiver Self   Support Systems Spouse/Significant Other   PCP Verified by CM Yes   Social/Functional History   Lives With Spouse   ADL Assistance Independent   Homemaking Assistance Independent   Ambulation Assistance Independent   Discharge Planning   Type of Residence House   Living Arrangements Spouse/Significant Other   DME Ordered? No   Potential Assistance Purchasing Medications No   Condition of Participation: Discharge Planning   The Plan for Transition of Care is related to the following treatment goals: Stress Test       4/21/22, 7:50 AM EDT  DISCHARGE PLANNING EVALUATION:     Viry Castaneda              Admitted: 4/20/2022/ [address] [place] [address] day: 0   Location: 8B-29/029-A Reason for admit: Chest pain [R07.9]  NSTEMI (non-ST elevated myocardial infarction) ([facility] [address] [I21.4]  Allergic reaction, initial encounter Elena Anderson   PMH:  has a past medical history of Hyperlipidemia and Hypertension. Procedure: No.  Barriers to Discharge: To ER with allergic reaction, rash, itching. Slight difficulty breathing. WBC's 16.0  PCP: EVELIO León CNP     Patient Goals/Plan/Treatment Preferences: Met with Doug Harrison today. From home with spouse and 5 children all at home. He is self sufficient and works. He has no home services or DME. He drives, is insured and no issues obtaining meds. He has a PCP.    Transportation/Food Security/Housekeeping Addressed:  No issues identified.

## 2025-03-10 NOTE — ED NOTES
Caller: WilkinSalvador    Relationship: Self    Best call back number:       [phone] (Mobile)       Requested Prescriptions:   Requested Prescriptions     Pending Prescriptions Disp Refills    hydrOXYzine pamoate (Vistaril) 25 MG capsule 60 capsule 0     Sig: Take 1 capsule by mouth 3 (Three) Times a Day As Needed for Itching.        Pharmacy where request should be sent: [facility] [id number] [address] [phone] [facility] [phone]      Last office visit with prescribing clinician: 1/31/2025   Last telemedicine visit with prescribing clinician: Visit date not found   Next office visit with prescribing clinician: Visit date not found     Additional details provided by patient:     Does the patient have less than a 3 day supply:  [x] Yes  [] No    Would you like a call back once the refill request has been completed: [] Yes [x] No    If the office needs to give you a call back, can they leave a voicemail: [] Yes [x] No       Report and paperwork given to [facility]. EMS. Pt. Transported in stable condition to [facility] to be direct admit to 8B-29. Pulse regular. Extremities warm. Respirations regular and quiet. Mucous membranes pink & moist. Alert and oriented times 3. No nausea or vomiting. Range of motion within patient's limits. Skin pink, warm and dry. Calm and cooperative. IV 0.9 NS infusing to LAc.       Елена Valerio RN  04/20/22 [id number]